# Patient Record
Sex: FEMALE | Race: OTHER | HISPANIC OR LATINO | URBAN - METROPOLITAN AREA
[De-identification: names, ages, dates, MRNs, and addresses within clinical notes are randomized per-mention and may not be internally consistent; named-entity substitution may affect disease eponyms.]

---

## 2020-10-11 ENCOUNTER — EMERGENCY (EMERGENCY)
Facility: HOSPITAL | Age: 25
LOS: 0 days | Discharge: HOME | End: 2020-10-12
Attending: EMERGENCY MEDICINE
Payer: MEDICAID

## 2020-10-11 VITALS
TEMPERATURE: 98 F | DIASTOLIC BLOOD PRESSURE: 75 MMHG | HEART RATE: 112 BPM | RESPIRATION RATE: 17 BRPM | OXYGEN SATURATION: 100 % | SYSTOLIC BLOOD PRESSURE: 130 MMHG

## 2020-10-11 VITALS — WEIGHT: 132.06 LBS | HEIGHT: 61 IN

## 2020-10-11 DIAGNOSIS — J02.9 ACUTE PHARYNGITIS, UNSPECIFIED: ICD-10-CM

## 2020-10-11 DIAGNOSIS — R07.0 PAIN IN THROAT: ICD-10-CM

## 2020-10-11 DIAGNOSIS — O99.891 OTHER SPECIFIED DISEASES AND CONDITIONS COMPLICATING PREGNANCY: ICD-10-CM

## 2020-10-11 DIAGNOSIS — O99.511 DISEASES OF THE RESPIRATORY SYSTEM COMPLICATING PREGNANCY, FIRST TRIMESTER: ICD-10-CM

## 2020-10-11 DIAGNOSIS — Z3A.13 13 WEEKS GESTATION OF PREGNANCY: ICD-10-CM

## 2020-10-11 PROCEDURE — 99283 EMERGENCY DEPT VISIT LOW MDM: CPT

## 2020-10-11 PROCEDURE — 87880 STREP A ASSAY W/OPTIC: CPT

## 2020-10-11 RX ORDER — AMOXICILLIN 250 MG/5ML
500 SUSPENSION, RECONSTITUTED, ORAL (ML) ORAL ONCE
Refills: 0 | Status: COMPLETED | OUTPATIENT
Start: 2020-10-11 | End: 2020-10-11

## 2020-10-11 RX ORDER — ACETAMINOPHEN 500 MG
650 TABLET ORAL ONCE
Refills: 0 | Status: COMPLETED | OUTPATIENT
Start: 2020-10-11 | End: 2020-10-11

## 2020-10-11 RX ORDER — AMOXICILLIN 250 MG/5ML
1 SUSPENSION, RECONSTITUTED, ORAL (ML) ORAL
Qty: 15 | Refills: 0
Start: 2020-10-11 | End: 2020-10-15

## 2020-10-11 RX ADMIN — Medication 500 MILLIGRAM(S): at 23:52

## 2020-10-11 RX ADMIN — Medication 40 MILLIGRAM(S): at 23:52

## 2020-10-11 RX ADMIN — Medication 650 MILLIGRAM(S): at 23:52

## 2020-10-11 NOTE — ED PROVIDER NOTE - ENMT, MLM
Airway patent, Nasal mucosa clear. Mouth with normal mucosa. Throat has no vesicles, no oropharyngeal exudates and uvula is midline. +erythematous posterior pharynx.

## 2020-10-11 NOTE — ED ADULT TRIAGE NOTE - CHIEF COMPLAINT QUOTE
Pt presents to ER c/o sore throat. Onset of symptoms began this morning. Pt reports it is painful to swallow. Pt reports she slept at family's house last night and slept next to the air condition and believes that may be the cause. Denies any other symptoms

## 2020-10-11 NOTE — ED PROVIDER NOTE - PATIENT PORTAL LINK FT
You can access the FollowMyHealth Patient Portal offered by [facility] by registering at the following website: [email or web address]. By joining [facility]’s FollowMyHealth portal, you will also be able to view your health information using other applications (apps) compatible with our system.

## 2020-10-11 NOTE — ED ADULT NURSE NOTE - OBJECTIVE STATEMENT
pt presents to the ED with sore throat. Pt reports it is painful to swallow. Pt reports she slept at family's house last night and slept next to the air condition and believes that may be the cause. Denies any other symptoms

## 2020-10-11 NOTE — ED ADULT NURSE NOTE - NSIMPLEMENTINTERV_GEN_ALL_ED
Implemented All Universal Safety Interventions:  [place] to call system. Call bell, personal items and telephone within reach. Instruct patient to call for assistance. Room bathroom lighting operational. Non-slip footwear when patient is off stretcher. Physically safe environment: no spills, clutter or unnecessary equipment. Stretcher in lowest position, wheels locked, appropriate side rails in place.

## 2020-10-11 NOTE — ED PROVIDER NOTE - OBJECTIVE STATEMENT
26 yo female , 13 weeks pregnant, with 1 day of sore throat, hoarse voice, diff swallowing. no fever. no cough. took tylenol at 2pm with relief. denies PMHX. Denies PSHx. NKDA. no cp, sob, abd pain. no n/v/d.

## 2020-10-11 NOTE — ED PROVIDER NOTE - CLINICAL SUMMARY MEDICAL DECISION MAKING FREE TEXT BOX
pt presents with acute pharyngitis, will send strep test, start on abx, steroids and give tylenol. f/u her pmd.

## 2020-10-12 LAB — S PYO AG SPEC QL IA: POSITIVE

## 2020-10-12 RX ORDER — AMOXICILLIN 250 MG/5ML
1 SUSPENSION, RECONSTITUTED, ORAL (ML) ORAL
Qty: 15 | Refills: 0
Start: 2020-10-12 | End: 2020-10-16

## 2021-02-22 ENCOUNTER — EMERGENCY (EMERGENCY)
Facility: HOSPITAL | Age: 26
LOS: 0 days | Discharge: HOME | End: 2021-02-22
Attending: EMERGENCY MEDICINE
Payer: MEDICAID

## 2021-02-22 VITALS
TEMPERATURE: 98 F | SYSTOLIC BLOOD PRESSURE: 113 MMHG | OXYGEN SATURATION: 96 % | RESPIRATION RATE: 17 BRPM | DIASTOLIC BLOOD PRESSURE: 70 MMHG | HEART RATE: 99 BPM

## 2021-02-22 VITALS — HEIGHT: 61 IN | WEIGHT: 154.1 LBS

## 2021-02-22 DIAGNOSIS — S60.469A: ICD-10-CM

## 2021-02-22 DIAGNOSIS — O9A.213 INJURY, POISONING AND CERTAIN OTHER CONSEQUENCES OF EXTERNAL CAUSES COMPLICATING PREGNANCY, THIRD TRIMESTER: ICD-10-CM
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
## 2021-02-22 DIAGNOSIS — S70.361A INSECT BITE (NONVENOMOUS), RIGHT THIGH, INITIAL ENCOUNTER: ICD-10-CM

## 2021-02-22 DIAGNOSIS — S60.561A INSECT BITE (NONVENOMOUS) OF RIGHT HAND, INITIAL ENCOUNTER: ICD-10-CM

## 2021-02-22 DIAGNOSIS — Z3A.28 28 WEEKS GESTATION OF PREGNANCY: ICD-10-CM
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
## 2021-02-22 PROCEDURE — 99282 EMERGENCY DEPT VISIT SF MDM: CPT

## 2021-02-22 NOTE — ED ADULT NURSE NOTE - CHIEF COMPLAINT QUOTE
pt is 7months pregnant c/o possible bed bug bites to the hand and R. upper thigh. small, itchy, red marks noted to R. thumb, 3rd finger, and top of hand, pt reports 3 more bites on the R. thigh. states, she used an old sheet after moving w/o washing it and then the bites appeared. pt brought to [facility] room.

## 2021-02-22 NOTE — ED ADULT NURSE NOTE - NSIMPLEMENTINTERV_GEN_ALL_ED
Implemented All Universal Safety Interventions:  [place] to call system. Call bell, personal items and telephone within reach. Instruct patient to call for assistance. Room bathroom lighting operational. Non-slip footwear when patient is off stretcher. Physically safe environment: no spills, clutter or unnecessary equipment. Stretcher in lowest position, wheels locked, appropriate side rails in place.

## 2021-02-22 NOTE — ED PROVIDER NOTE - PATIENT PORTAL LINK FT
You can access the FollowMyHealth Patient Portal offered by [facility] by registering at the following website: [email or web address]. By joining [employer]’s FollowMyHealth portal, you will also be able to view your health information using other applications (apps) compatible with our system.

## 2021-02-22 NOTE — ED PROVIDER NOTE - CARE PLAN
Principal Discharge DX:	Bug bite of hand, infected, right, initial encounter  Secondary Diagnosis:	Bug bite, initial encounter

## 2021-02-22 NOTE — ED PROVIDER NOTE - NSFOLLOWUPINSTRUCTIONS_ED_ALL_ED_FT
Log Out.      [employer]® CareNotes®     :  [facility]  	                       INSECT BITE OR STING - General Information           Mordida o picadura de insecto    LO QUE NECESITA SABER:    ¿Qué necesito saber acerca de la mordida o picadura de un insecto?La mayoría de las mordidas o picaduras de insecto no son peligrosas y desaparecen sin tratamiento. Algunos ejemplos comunes de insectos que muerden o pican son las abejas, garrapatas, mosquitos, arañas y hormigas. Las picaduras de insecto pueden desencadenar enfermedades devora la malaria, virus del [place], la enfermedad del Lyme o fiebre manchada de las [place].    ¿Cuáles son los signos y síntomas de herlinda reacción alérgica de herlinda mordedura o picadura de insecto?  •Los síntomas levesincluye herlinda protuberancia maureen, dolor, inflamación y comezón.      •Los síntomas de la anafilaxiaincluyen opresión en la garganta, dificultad para respirar, hormigueo, mareos y sibilancias. La anafilaxia es herlinda reacción repentina y de peligro mortal que requiere de tratamiento inmediato.      ¿Cómo se trata herlinda reacción alérgica a herlinda mordida o picadura de insecto?El tratamiento depende de la severidad de kirill síntomas y de si usted ha sufrido de anafilaxia anteriormente. Es posible que usted necesite alguno de los siguientes:  •Los antihistamínicosdisminuyen los síntomas leves devora comezón o sarpullido.      •La epinefrinaes un medicamento usado para tratar reacciones alérgicas graves devora la anafilaxia.      •Herlinda vacuna antitetánicapodrían administrarse. La inyección es herlinda vacuna que ayuda a prevenir herlinda infección bacteriana. El refuerzo de la vacuna antitetánica suele aplicarse cada 10 años.      ¿Qué pasos necesito seguir cuando hay señales o síntomas de anafilaxia?  •Inmediatamenteaplíquese 1 inyección de epinefrina justino hágalo solamente en el músculo del muslo que da hacia afuera.  Cómo aplicar herlinda inyección de epinefrina a un adulto           •Deje la inyección en el lugarsegún las indicaciones. Es probable que gonzalez médico le recomiende que se la deje puesta por hasta 10 segundos antes de quitarla. [place] ayuda a asegurarse de que toda la epinefrina sea aplicada.      •Llame al 911 y vaya al servicio de urgencias,aunque la inyección haya stepan kirill síntomas. No maneje usted mismo. Lleve con usted la inyección de epinefrina que usó.      ¿Que debería hacer si recibo herlinda mordida o picadura de insecto?  •Remueva el aguijón.Raspe el aguijón con la uña de gonzalez dedo, con la orilla de herlinda tarjeta de crédito o con un cuchillo. No apriete la herida. Lave suavemente el área con jabón y agua.      •Remueva la garrapata.Las garrapatas deben quitarse lo más pronto posible para que usted no contraiga enfermedades trasmitidas por la mordida de herlinda garrapata. Pida más información a gonzalez médico acerca de las mordidas de garrapata o cómo removerlas.      ¿Qué puedo hacer para cuidar mi herida de mordida o picadura?  •Eleve el área por encima del nivel del corazón, si es posible.Coloque el área sobre almohadas para mantenerla elevada cómodamente. Eleve el área de 10 a 20 minutos cada hora o devora se lo indique gonzalez médico.             •Aplique compresas.Remoje un paño limpio en agua fría, escúrralo y aplíquelo en la mordida o picadura. Use la compresa de 10 a 20 minutos cada hora o devora le lo indique gonzalez médico. Después de 24 a 48 horas, cambie a compresas tibias.      •Aplique pasta de bicarbonato de sodio.Agregue agua a bicarbonato de sodio para hacer herlinda pasta espesa. Aplíquela en el área por 5 minutos. Enjuague suavemente para remover la pasta.      ¿Que precauciones de seguridad necesito seguir si estoy en riesgo de presentar anafilaxia?  •Tenga a mano 2 inyecciones de epinefrina en todo momento.Puede que usted necesite herlinda segunda inyección ya que la epinefrina solamente actúa por aproximadamente 20 minutos y los síntomas podrían regresar. Gonzalez médico puede mostrarle a usted y a kirill familiares cómo aplicar la inyección. Revise la fecha de vencimiento todos los meses y reemplace el medicamento de gonzalez vencimiento.      •Elabore un plan de acción.Gonzalez médico puede ayudarle a crear un plan escrito que explique la alergia y un plan de emergencia para tratar herlinda reacción. El plan explica cuándo aplicar herlinda segunda inyección de epinefrina si los síntomas vuelven o no mejoran después de la primera inyección. Asegúrese de que kirill familiares, personal de gonzalez trabajo y escuela tengan herlinda copia del plan de acción e instrucciones de emergencia. Muéstreles cómo aplicar la inyección de epinefrina.      •Lleve herlinda identificación de alerta médica.Use accesorios o lleve herlinda tarjeta que diga que tiene alergia a los insectos. Pregúntele a gonzalez médico dónde conseguir estos artículos.  Accesorios de alerta médica           ¿Qué puedo hacer para evitar herlinda mordida o picadura de insecto?  •No vista ropa de colores brillantes o con estampados em cuando planee pasar tiempo al aire luigi. No utilice spray para el zackary, perfumes o crema para afeitar.      •No deje alimentos afuera.      •Vacíe el agua estancada. Lave los contenedores con jabón y agua cada 2 días.      •Coloque mosquiteros en todas las ventanas y blanche abiertas.      •Aplique repelente de insectos que contenga DEET sobre la piel descubierta cuando esté al aire luigi. Aplique el repelente en la parte superior de las botas, en la parte inferior del pantalón y en los puños de las camisas. Vista con manga larga, pantalones y zapatos.      •Utilice jomar de citronela al aire luigi para ayudar a mantener los mosquitos alejados. Coloque un collar contra las pulgas y garrapatas a kirill mascotas.      Llame al número local de emergencias (911 en los [place]) si tiene síntomas o signos de anafilaxia,devora dificultad para respirar, inflamación en gonzalez boca o garganta, o sibilancias. También es posible que tenga comezón, sarpullido, urticaria o sienta que se va a desmayar.    ¿Cuándo catarino buscar atención inmediata?  •Usted recibe herlinda picadura en gonzalez lengua o en gonzalez garganta.      •Se forma un área aashish alrededor de la mordida.      •Usted está sudando demasiado o tiene dolor en gonzalez cuerpo.      •Usted piensa que lo mordió o picó un insecto venenoso.      ¿Cuándo catarino llamar a mi médico?  •Tiene fiebre.      •El área se pone maureen, cálida, sensible e inflamada más allá del área de la mordida o de la picadura.      •Usted tiene preguntas o inquietudes acerca de gonzalez condición o cuidado.      ACUERDOS SOBRE GONZALEZ CUIDADO:    Usted tiene el derecho de ayudar a planear gonzalez cuidado. Aprenda todo lo que pueda sobre gonzalez condición y devora darle tratamiento. Discuta kirill opciones de tratamiento con kirill médicos para decidir el cuidado que usted desea recibir. Usted siempre tiene el derecho de rechazar el tratamiento.

## 2021-02-22 NOTE — ED PROVIDER NOTE - PROGRESS NOTE DETAILS
pt seen and evaluated and has bug bites to left lateral thigh and right web of 4 and 5 finger. pt denies taking any medication for symptoms. pt advised to clean area of insect bites and use hot water to wash everything and clean her matress and chairs and fu with her ob/gyn, she can use over the counter topical anti itch cream also, pt agrees with plan and well appearing on dc, pt was decon at the hospital when she arrived. -Mary Alice Sims PA-C pt was decon'ed in the ED. MD SKYE

## 2021-02-22 NOTE — ED PROVIDER NOTE - CONSTITUTIONAL [-], MLM
Quality 130: Documentation Of Current Medications In The Medical Record: Current Medications Documented
Detail Level: Generalized
no fever

## 2021-02-22 NOTE — ED PROVIDER NOTE - OBJECTIVE STATEMENT
69 y/o female with no pertinent PMHx presents to the ED c/o insect bites. Pt recently moved, borrowed cloth furniture from her in-laws, and the next days noticed small insect bites on her right thigh and right finger with surrounding redness, itching. Pt is 7 months pregnant. Pt has no other medical complaints at this time.

## 2021-02-22 NOTE — ED ADULT TRIAGE NOTE - CHIEF COMPLAINT QUOTE
pt is 7months pregnant c/o possible bed bug bites to the hand and R. upper thigh. small, itchy, red marks noted to R. thumb, 3rd finger, and top of hand, pt reports 3 more bites on the R. thigh. states, she used an old sheet after moving w/o washing it and then the bites appeared. pt brought to [facility] room.

## 2021-02-22 NOTE — ED PROVIDER NOTE - SKIN, MLM
Skin normal color for race, warm, dry and intact. +3 pinpoint lesions with surrounding erythema, right lateral thigh, one on right lateral 4th, 5th web of finger

## 2021-08-19 ENCOUNTER — EMERGENCY (EMERGENCY)
Facility: HOSPITAL | Age: 26
LOS: 0 days | Discharge: HOME | End: 2021-08-19
Attending: EMERGENCY MEDICINE
Payer: MEDICAID

## 2021-08-19 VITALS
HEIGHT: 61 IN | WEIGHT: 136.03 LBS | OXYGEN SATURATION: 97 % | RESPIRATION RATE: 18 BRPM | HEART RATE: 76 BPM | TEMPERATURE: 99 F | SYSTOLIC BLOOD PRESSURE: 107 MMHG | DIASTOLIC BLOOD PRESSURE: 58 MMHG

## 2021-08-19 DIAGNOSIS — R09.81 NASAL CONGESTION: ICD-10-CM

## 2021-08-19 DIAGNOSIS — U07.1 COVID-19: ICD-10-CM

## 2021-08-19 LAB — SARS-COV-2 RNA SPEC QL NAA+PROBE: DETECTED

## 2021-08-19 PROCEDURE — 99284 EMERGENCY DEPT VISIT MOD MDM: CPT

## 2021-08-19 PROCEDURE — U0003: CPT

## 2021-08-19 PROCEDURE — U0005: CPT

## 2021-08-19 PROCEDURE — 99282 EMERGENCY DEPT VISIT SF MDM: CPT

## 2021-08-19 NOTE — ED STATDOCS - NSTIMEPROVIDERCAREINITIATE_GEN_ER
Hypertension - Start taking 12.5 MG losartan-hydrochlorothiazide once daily. Followup in one week for blood pressure recheck.    [facility]                        To reach your care team during and after hours:   [phone]  To reach our pharmacy:        [phone]    Clinic Hours                        Our clinic hours are:    Monday   7:30 am to 7:00 pm                  Tuesday through Friday 7:30 am to 5:00 pm                             Saturday   8:00 am to 12:00 pm      Sunday   Closed      Pharmacy Hours                        Our pharmacy hours are:    Monday   8:30 am to 7:00 pm       Tuesday to Friday  8:30 am to 6:00 pm                       Saturday    9:00 am to 1:00 pm              Sunday    Closed              There is also information available at our web site:  [email or web address]    If your provider ordered any lab tests and you do not receive the results within 10 business days, please call the clinic.    If you need a medication refill please contact your pharmacy.  Please allow 2-3 business days for your refill to be completed.    Our clinic offers telephone visits and e visits.  Please ask one of your team members to explain more.      Use [employer] (secure email communication and access to your chart) to send your primary care provider a message or make an appointment. Ask someone on your Team how to sign up for [employer].  Immunizations                      Immunization History   Administered Date(s) Administered     Influenza (H1N1) 01/12/2010     Influenza (IIV3) 11/05/2008, 11/03/2009, 12/10/2010     TD (ADULT, 7+) 01/01/2005     TDAP Vaccine (Boostrix) 07/29/2015        Health Maintenance                         Health Maintenance Due   Topic Date Due     Microalbumin Lab - yearly  07/18/1959     Colon Cancer Screening - FIT Test - yearly  07/18/1968     Discuss Advance Directive Planning  07/18/1976     Hepatitis C Screening  07/18/1976     Cholesterol Lab - yearly   06/16/2010     Prostate Test (PSA) - yearly  04/01/2015     Basic Metabolic Lab - yearly  09/23/2016        19-Aug-2021 18:42

## 2021-08-19 NOTE — ED STATDOCS - NS ED ROS FT
Gen: No fever, normal appetite  Eyes: No eye irritation or discharge  ENT: No ear pain, sore throat, +nasal congestion  Resp: No cough or trouble breathing  Cardiovascular: No chest pain or palpitation  Gastroenteric: No nausea/vomiting, diarrhea, constipation  :  No change in urine output; no dysuria  MS: No joint or muscle pain  Skin: No rashes  Neuro: No headache; no abnormal movements  Remainder negative, except as per the HPI

## 2021-08-19 NOTE — ED STATDOCS - OBJECTIVE STATEMENT
24 y/o female with no pertinent PMHx, presents to the ED c/o nasal congestion x 1 week. Pt is not COVID vaccinated. States her  had a fever on Monday and he was tested +. Pt requesting a COVID test. Pt otherwise feels well.

## 2021-08-19 NOTE — ED STATDOCS - NSFOLLOWUPINSTRUCTIONS_ED_ALL_ED_FT
COVID-19 (CORONAVIRUS DISEASE 2019) - [facility]) Instructions(ER/ED)           COVID-19 (Coronavirus Disease 2019)    WHAT YOU NEED TO KNOW:    Coronavirus disease 2019 (COVID-19) is the disease caused by a coronavirus first discovered in 2019. Coronaviruses generally cause upper respiratory (nose, throat, and lung) infections, such as a cold. The new virus spreads quickly and easily. The virus can be spread starting 2 days before symptoms even begin. The virus has also changed into several new forms (called variants) since it was discovered. The variants may be more contagious (easily spread) than the original form. Some may also cause more severe illness than others. It is important to follow local, national, and worldwide measures to protect yourself and others from infection.    DISCHARGE INSTRUCTIONS:    If you think you or someone you know may be infected: Do the following to protect others:   •If emergency care is needed, tell the  about the possible infection, or call ahead and tell the emergency department.      •Call a healthcare provider for instructions if symptoms are mild. Anyone who may be infected should not arrive without calling first. The provider will need to protect staff members and other patients.      •The person who may be infected needs to wear a face covering while getting medical care. This will help lower the risk of infecting others. Coverings are not used for anyone who is younger than 2 years, has breathing problems, or cannot remove it. The provider can give you instructions for anyone who cannot wear a covering.      Call your local emergency number (911 in the [place]) or an emergency department if:   •You have trouble breathing or shortness of breath at rest.      •You have chest pain or pressure that lasts longer than 5 minutes.      •You become confused or hard to wake.      •Your lips or face are blue.      •You have a fever of 104°F (40°C) or higher.      Call your doctor if:   •You do not have symptoms of COVID-19 but had close physical contact within 14 days with someone who tested positive.      •You have questions or concerns about your condition or care.      Medicines: You may need any of the following for mild symptoms:   •Decongestants help reduce nasal congestion and help you breathe more easily. If you take decongestant pills, they may make you feel restless or cause problems with your sleep. Do not use decongestant sprays for more than a few days.      •Cough suppressants help reduce coughing. Ask your healthcare provider which type of cough medicine is best for you.      •Acetaminophen decreases pain and fever. It is available without a doctor's order. Ask how much to take and how often to take it. Follow directions. Read the labels of all other medicines you are using to see if they also contain acetaminophen, or ask your doctor or pharmacist. Acetaminophen can cause liver damage if not taken correctly. Do not use more than 4 grams (4,000 milligrams) total of acetaminophen in one day.       •NSAIDs, such as ibuprofen, help decrease swelling, pain, and fever. NSAIDs can cause stomach bleeding or kidney problems in certain people. If you take blood thinner medicine, always ask your healthcare provider if NSAIDs are safe for you. Always read the medicine label and follow directions.      •Take your medicine as directed. Contact your healthcare provider if you think your medicine is not helping or if you have side effects. Tell him or her if you are allergic to any medicine. Keep a list of the medicines, vitamins, and herbs you take. Include the amounts, and when and why you take them. Bring the list or the pill bottles to follow-up visits. Carry your medicine list with you in case of an emergency.      How the 2019 coronavirus spreads: The following are ways the virus is thought to spread, but more information may be coming:   •Droplets are the main way all coronaviruses spread. The virus travels in droplets that form when a person talks, coughs, or sneezes. The droplets can also float in the air for minutes or hours. Infection happens when you breathe in the droplets or get them in your eyes or nose. Close personal contact with an infected person increases your risk for infection. This means being within 6 feet (2 meters) of the person for at least 15 minutes over 24 hours.      •Person-to-person contact can spread the virus. For example, a person with the virus on his or her hands can spread it by shaking hands with someone.      •The virus can stay on objects and surfaces for a short time. You may become infected by touching the object or surface and then touching your eyes or mouth.      •An infected animal may be able to infect a person who touches it. This may happen at live markets or on a farm.      Help lower the risk for COVID-19: The best way to prevent infection is to avoid anyone who is infected, but this can be hard to do. An infected person can spread the virus before signs or symptoms begin, or even if signs or symptoms never develop. The following can help lower the risk for infection:     Prevent COVID-19 Infection     •Wash your hands often throughout the day. Use soap and water. Rub your soapy hands together, lacing your fingers, for at least 20 seconds. Rinse with warm, running water. Dry your hands with a clean towel or paper towel. Use hand  that contains alcohol if soap and water are not available. Teach children how to wash their hands and use hand .  Handwashing           •Cover sneezes and coughs. Turn your face away and cover your mouth and nose with a tissue. Throw the tissue away. Use the bend of your arm if a tissue is not available. Then wash your hands well with soap and water or use hand . Teach children how to cover a cough or sneeze.      •Wear a face covering (mask) around anyone who does not live in your home. Use a cloth covering with at least 2 layers. You can also create layers by putting a cloth covering over a disposable non-medical mask. Cover your mouth and your nose. The covering should fit snugly against the bridge of your nose. Securely fasten it under your chin and on the sides of your face. Do not wear a plastic face shield instead of a covering. Continue social distancing and washing your hands often. A face covering is not a substitute for social distancing safety measures.  How to Wear a Face Covering (Mask)           •Follow worldwide, national, and local social distancing guidelines. Keep at least 6 feet (2 meters) between you and others. Also keep this distance from anyone who comes to your home, such as someone making a delivery. Wear a face covering while you are around others. You will need to wear a covering in restaurants, stores, and other public buildings. You will also need a covering on mass transit, such as a bus, subway, or airplane. Remember to use a covering made from thick material or wear 2 coverings together.      •Make a habit of not touching your face. If you get the virus on your hands, you can transfer it to your eyes, nose, or mouth and become infected. You can also transfer it to objects, surfaces, or people. Do not touch your eyes, nose, or mouth without washing your hands first.      •Clean and disinfect high-touch surfaces and objects often. Use disinfecting wipes, or make a solution of 4 teaspoons of bleach in 1 quart (4 cups) of water. Clean and disinfect even if you think no one living in or coming to your home is infected with the virus.      •Ask about vaccines you may need. Get a COVID-19 vaccine when it is available to you. The current vaccines are given as a shot in 1 or 2 doses. Get the influenza (flu) vaccine as soon as recommended each year, usually starting in September or October. Get the pneumonia vaccine if recommended.      Follow social distancing guidelines: National and local social distancing rules vary. Rules may change over time as restrictions are lifted. Restrictions may return if an outbreak happens where you live. It is important to know and follow all current social distancing rules in your area. The following are general guidelines:  •Stay home if you are sick or think you may have COVID-19. It is important to stay home if you are waiting for a testing appointment or for test results. Even if you do not have symptoms, you can pass the virus to others.      •Limit trips out of your home. Have food, medicines, and other supplies delivered and left at your door or other area, if possible. Plan trips out of your home so you make the fewest stops possible to limit close personal contact. Keep track of places you go. This will help contact tracers notify others if you become infected.      •Avoid close physical contact with anyone who does not live in your home. Do not shake hands with, hug, or kiss a person as a greeting. If you must use public transportation (such as a bus or subway), try to sit or stand away from others. Only allow necessary people into your home. Wear your face covering, and remind others to wear a face covering. Remind them to wash their hands when they arrive and before they leave. Do not let someone into your home or go to someone's home just to visit. Even if you both do not feel sick, the virus can pass from one of you to the other.      •Avoid in-person gatherings and crowds. Gatherings or crowds of 10 or more individuals can cause the virus to spread. Avoid places such as franks, beaches, sporting events, and tourist attractions. For events such as parties, holiday meals, [employer] services, and conferences, attend virtually (on a computer), if possible.      •Ask your healthcare provider for other ways to have appointments. Some providers offer phone, video, or other types of appointments. You may also be able to get prescriptions for a few months of your medicines at a time.      •Stay safe if you must go out to work. Keep physical distance between you and other workers as much as possible. Follow your employer's rules so everyone stays safe.      If you have COVID-19 and are recovering at home, healthcare providers will give you specific instructions to follow. The following are general guidelines to remind you how to keep others safe until you are well:   •Wash your hands often. Use soap and water as much as possible. Use hand  that contains alcohol if soap and water are not available. Dry your hands with a clean towel or paper towel. Do not share towels with anyone. If you use paper towels, throw them away in a lined trash can kept in your room or area. Use a covered trash can, if possible.      •Do not go out of your home unless it is necessary. Ask someone who is not infected to go out for groceries or supplies, or have them delivered. Do not go to your healthcare provider's office without an appointment.      •Only have close physical contact with a person giving direct care, or a baby or child you must care for. Family members and friends should not visit you. If possible, stay in a separate area or room of your home if you live with others. No one should go into the area or room except to give you care. You can visit with others by phone, video chat, e-mail, or similar systems.      •Wear a face covering while others are near you. This can help prevent droplets from spreading the virus when you talk, sneeze, or cough. Put the covering on before anyone comes into your room or area. Remind the person to cover his or her nose and mouth before coming in to provide care for you.      •Do not share items. Do not share dishes, towels, or other items with anyone. Items need to be washed after you use them.      •Protect your baby. Some newborns have tested positive for the virus. It is not known if they became infected before or after birth. The highest risk is when a  has close contact with an infected person. If you are pregnant or breastfeeding, talk to your healthcare provider or obstetrician about any concerns you have. He or she will tell you when to bring your baby in for check-ups and vaccines. He or she will also tell you what to do if you think your baby was infected with the coronavirus. Wash your hands and put on a clean face covering before you breastfeed or care for your baby.      •Do not handle live animals unless it is necessary. Some animals, including pets, have been infected with the new coronavirus. Ask someone who is not infected to take care of your pet until you are well. If you must care for a pet, wear a face covering. Wash your hands before and after you give care. Talk to your healthcare provider about how to keep a service animal safe, if needed.      •Follow directions from your healthcare provider for being around others after you recover. It is not known if or for how long a recovered person can pass the virus to others. Your provider may give you instructions, such as continuing social distancing and wearing a face covering. He or she will tell you when it is okay to be around others again. This may be 10 to 20 days after symptoms started or you had a positive test. Most symptoms will also need to be gone. Your provider will give you more information.      Follow up with your doctor as directed: Write down your questions so you remember to ask them during your visits.    For more information:   •[employer] [employer] Disease Control and Prevention  [address] [place] [address]  Phone: 1-237.449.4842  Web Address: http://www.cdc.gov           © Copyright [employer]            back to top                          © Copyright [employer]

## 2021-08-19 NOTE — ED ADULT TRIAGE NOTE - CHIEF COMPLAINT QUOTE
pt presents to ED requesting COVID-19 test, states + cough and loss of sense of smell x 4 days.  is + for COVID

## 2021-08-19 NOTE — ED STATDOCS - PATIENT PORTAL LINK FT
You can access the FollowMyHealth Patient Portal offered by [facility] by registering at the following website: [email or web address] By joining [facility]’s FollowMyHealth portal, you will also be able to view your health information using other applications (apps) compatible with our system.

## 2021-08-20 PROBLEM — Z78.9 OTHER SPECIFIED HEALTH STATUS: Chronic | Status: ACTIVE | Noted: 2021-02-22

## 2022-09-26 ENCOUNTER — EMERGENCY (EMERGENCY)
Facility: HOSPITAL | Age: 27
LOS: 0 days | Discharge: HOME | End: 2022-09-27
Attending: EMERGENCY MEDICINE
Payer: MEDICAID

## 2022-09-26 VITALS — HEIGHT: 61 IN | WEIGHT: 106.04 LBS

## 2022-09-26 DIAGNOSIS — R30.0 DYSURIA: ICD-10-CM

## 2022-09-26 DIAGNOSIS — N39.0 URINARY TRACT INFECTION, SITE NOT SPECIFIED: ICD-10-CM

## 2022-09-26 DIAGNOSIS — R39.15 URGENCY OF URINATION: ICD-10-CM

## 2022-09-26 DIAGNOSIS — R35.0 FREQUENCY OF MICTURITION: ICD-10-CM

## 2022-09-26 DIAGNOSIS — Z97.5 PRESENCE OF (INTRAUTERINE) CONTRACEPTIVE DEVICE: ICD-10-CM

## 2022-09-26 PROCEDURE — 87086 URINE CULTURE/COLONY COUNT: CPT

## 2022-09-26 PROCEDURE — 99283 EMERGENCY DEPT VISIT LOW MDM: CPT

## 2022-09-26 PROCEDURE — 99284 EMERGENCY DEPT VISIT MOD MDM: CPT

## 2022-09-26 PROCEDURE — 81001 URINALYSIS AUTO W/SCOPE: CPT

## 2022-09-26 RX ORDER — PHENAZOPYRIDINE HCL 100 MG
100 TABLET ORAL ONCE
Refills: 0 | Status: COMPLETED | OUTPATIENT
Start: 2022-09-26 | End: 2022-09-26

## 2022-09-26 RX ADMIN — Medication 100 MILLIGRAM(S): at 23:25

## 2022-09-26 NOTE — ED PROVIDER NOTE - NSFOLLOWUPINSTRUCTIONS_ED_ALL_ED_FT
Urinary Tract Infection, Adult  ImageA urinary tract infection (UTI) is an infection of any part of the urinary tract, which includes the kidneys, ureters, bladder, and urethra. These organs make, store, and get rid of urine in the body. UTI can be a bladder infection (cystitis) or kidney infection (pyelonephritis).    What are the causes?  This infection may be caused by fungi, viruses, or bacteria. Bacteria are the most common cause of UTIs. This condition can also be caused by repeated incomplete emptying of the bladder during urination.    What increases the risk?  This condition is more likely to develop if:    You ignore your need to urinate or hold urine for long periods of time.  You do not empty your bladder completely during urination.  You wipe back to front after urinating or having a bowel movement, if you are female.  You are uncircumcised, if you are male.  You are constipated.  You have a urinary catheter that stays in place (indwelling).  You have a weak defense (immune) system.  You have a medical condition that affects your bowels, kidneys, or bladder.  You have diabetes.  You take antibiotic medicines frequently or for long periods of time, and the antibiotics no longer work well against certain types of infections (antibiotic resistance).  You take medicines that irritate your urinary tract.  You are exposed to chemicals that irritate your urinary tract.  You are female.    What are the signs or symptoms?  Symptoms of this condition include:    Fever.  Frequent urination or passing small amounts of urine frequently.  Needing to urinate urgently.  Pain or burning with urination.  Urine that smells bad or unusual.  Cloudy urine.  Pain in the lower abdomen or back.  Trouble urinating.  Blood in the urine.  Vomiting or being less hungry than normal.  Diarrhea or abdominal pain.  Vaginal discharge, if you are female.    How is this diagnosed?  This condition is diagnosed with a medical history and physical exam. You will also need to provide a urine sample to test your urine. Other tests may be done, including:    Blood tests.  Sexually transmitted disease (STD) testing.    If you have had more than one UTI, a cystoscopy or imaging studies may be done to determine the cause of the infections.    How is this treated?  Treatment for this condition often includes a combination of two or more of the following:    Antibiotic medicine.  Other medicines to treat less common causes of UTI.  Over-the-counter medicines to treat pain.  Drinking enough water to stay hydrated.    Follow these instructions at home:  Take over-the-counter and prescription medicines only as told by your health care provider.  If you were prescribed an antibiotic, take it as told by your health care provider. Do not stop taking the antibiotic even if you start to feel better.  Avoid alcohol, caffeine, tea, and carbonated beverages. They can irritate your bladder.  Drink enough fluid to keep your urine clear or pale yellow.  Keep all follow-up visits as told by your health care provider. This is important.  ImageMake sure to:    Empty your bladder often and completely. Do not hold urine for long periods of time.  Empty your bladder before and after sex.  Wipe from front to back after a bowel movement if you are female. Use each tissue one time when you wipe.    Contact a health care provider if:  You have back pain.  You have a fever.  You feel nauseous or vomit.  Your symptoms do not get better after 3 days.  Your symptoms go away and then return.  Get help right away if:  You have severe back pain or lower abdominal pain.  You are vomiting and cannot keep down any medicines or water.  This information is not intended to replace advice given to you by your health care provider. Make sure you discuss any questions you have with your health care provider. See your doctor within 1 week.    Urinary Tract Infection, Adult    A urinary tract infection (UTI) is an infection of any part of the urinary tract, which includes the kidneys, ureters, bladder, and urethra. These organs make, store, and get rid of urine in the body. UTI can be a bladder infection (cystitis) or kidney infection (pyelonephritis).    What are the causes?  This infection may be caused by fungi, viruses, or bacteria. Bacteria are the most common cause of UTIs. This condition can also be caused by repeated incomplete emptying of the bladder during urination.    What increases the risk?  This condition is more likely to develop if:    You ignore your need to urinate or hold urine for long periods of time.  You do not empty your bladder completely during urination.  You wipe back to front after urinating or having a bowel movement, if you are female.  You are uncircumcised, if you are male.  You are constipated.  You have a urinary catheter that stays in place (indwelling).  You have a weak defense (immune) system.  You have a medical condition that affects your bowels, kidneys, or bladder.  You have diabetes.  You take antibiotic medicines frequently or for long periods of time, and the antibiotics no longer work well against certain types of infections (antibiotic resistance).  You take medicines that irritate your urinary tract.  You are exposed to chemicals that irritate your urinary tract.  You are female.    What are the signs or symptoms?  Symptoms of this condition include:    Fever.  Frequent urination or passing small amounts of urine frequently.  Needing to urinate urgently.  Pain or burning with urination.  Urine that smells bad or unusual.  Cloudy urine.  Pain in the lower abdomen or back.  Trouble urinating.  Blood in the urine.  Vomiting or being less hungry than normal.  Diarrhea or abdominal pain.  Vaginal discharge, if you are female.    How is this diagnosed?  This condition is diagnosed with a medical history and physical exam. You will also need to provide a urine sample to test your urine. Other tests may be done, including:    Blood tests.  Sexually transmitted disease (STD) testing.    If you have had more than one UTI, a cystoscopy or imaging studies may be done to determine the cause of the infections.    How is this treated?  Treatment for this condition often includes a combination of two or more of the following:    Antibiotic medicine.  Other medicines to treat less common causes of UTI.  Over-the-counter medicines to treat pain.  Drinking enough water to stay hydrated.    Follow these instructions at home:  Take over-the-counter and prescription medicines only as told by your health care provider.  If you were prescribed an antibiotic, take it as told by your health care provider. Do not stop taking the antibiotic even if you start to feel better.  Avoid alcohol, caffeine, tea, and carbonated beverages. They can irritate your bladder.  Drink enough fluid to keep your urine clear or pale yellow.  Keep all follow-up visits as told by your health care provider. This is important.  ImageMake sure to:    Empty your bladder often and completely. Do not hold urine for long periods of time.  Empty your bladder before and after sex.  Wipe from front to back after a bowel movement if you are female. Use each tissue one time when you wipe.    Contact a health care provider if:  You have back pain.  You have a fever.  You feel nauseous or vomit.  Your symptoms do not get better after 3 days.  Your symptoms go away and then return.  Get help right away if:  You have severe back pain or lower abdominal pain.  You are vomiting and cannot keep down any medicines or water.  This information is not intended to replace advice given to you by your health care provider. Make sure you discuss any questions you have with your health care provider.

## 2022-09-26 NOTE — ED ADULT TRIAGE NOTE - IDEAL BODY WEIGHT(KG)
Panel Management Review      Patient has the following on her problem list:     Depression / Dysthymia review  PHQ-9 SCORE 1/11/2016 6/14/2016 10/11/2016   Total Score - - -   Total Score - - -   Total Score 9 9 5   Total Score - - -      Patient is due for:  PHQ9    Asthma review     ACT Total Scores 5/17/2017   ACT TOTAL SCORE -   ASTHMA ER VISITS -   ASTHMA HOSPITALIZATIONS -   ACT TOTAL SCORE (Goal Greater than or Equal to 20) 19   In the past 12 months, how many times did you visit the emergency room for your asthma without being admitted to the hospital? 0   In the past 12 months, how many times were you hospitalized overnight because of your asthma? 0      1. Is Asthma diagnosis on the Problem List? Yes    2. Is Asthma listed on Health Maintenance? Yes    3. Patient is due for:  ACT and AAP    Diabetes    ASA: Passed    Last A1C  Lab Results   Component Value Date    A1C 6.6 05/17/2017    A1C 8.1 03/02/2017    A1C 6.0 09/23/2015    A1C 5.4 07/27/2013     A1C tested: Passed    Last LDL:    Lab Results   Component Value Date    CHOL 253 06/14/2016     Lab Results   Component Value Date    HDL 35 06/14/2016     Lab Results   Component Value Date    LDL  06/14/2016     Cannot estimate LDL when triglyceride exceeds 400 mg/dL   Above desirable:  100-129 mg/dl   Borderline High:  130-159 mg/dL   High:             160-189 mg/dL   Very high:       >189 mg/dl       Lab Results   Component Value Date    TRIG 545 06/14/2016     Lab Results   Component Value Date    CHOLHDLRATIO 4.7 04/04/2014     Lab Results   Component Value Date    NHDL 218 06/14/2016       Is the patient on a Statin? NO             Is the patient on Aspirin? NO        Last three blood pressure readings:  BP Readings from Last 3 Encounters:   05/17/17 120/70   03/28/17 120/70   03/21/17 130/80       Date of last diabetes office visit: 5/17/17     Tobacco History:     History   Smoking Status     Current Some Day Smoker     Packs/day: 0.25     Years:  17.00     Types: Cigarettes   Smokeless Tobacco     Never Used                 Composite cancer screening  Chart review shows that this patient is due/due soon for the following Pap Smear  Summary:    Patient is due/failing the following:   ACT, PAP, PHQ9 and PHYSICAL    Action needed:   Patient needs office visit for physical and pap.    Type of outreach:    Sent letter.    Questions for provider review:    None                                                                                                                                    Genesis Stone CMA              48

## 2022-09-26 NOTE — ED PROVIDER NOTE - CLINICAL SUMMARY MEDICAL DECISION MAKING FREE TEXT BOX
Dysuria X 1 day, low back pain X 2 days.  Will check UCG, UA and treat for uncomplicated UTI if UA positive.

## 2022-09-26 NOTE — ED PROVIDER NOTE - PATIENT PORTAL LINK FT
You can access the FollowMyHealth Patient Portal offered by [facility] by registering at the following website: [email or web address]. By joining [employer]’s FollowMyHealth portal, you will also be able to view your health information using other applications (apps) compatible with our system.

## 2022-09-27 VITALS
TEMPERATURE: 98 F | SYSTOLIC BLOOD PRESSURE: 110 MMHG | OXYGEN SATURATION: 100 % | DIASTOLIC BLOOD PRESSURE: 80 MMHG | HEART RATE: 82 BPM | RESPIRATION RATE: 16 BRPM

## 2022-09-27 LAB
APPEARANCE UR: CLEAR — SIGNIFICANT CHANGE UP
BILIRUB UR-MCNC: NEGATIVE — SIGNIFICANT CHANGE UP
COLOR SPEC: YELLOW — SIGNIFICANT CHANGE UP
DIFF PNL FLD: ABNORMAL
GLUCOSE UR QL: NEGATIVE — SIGNIFICANT CHANGE UP
KETONES UR-MCNC: NEGATIVE — SIGNIFICANT CHANGE UP
LEUKOCYTE ESTERASE UR-ACNC: ABNORMAL
NITRITE UR-MCNC: NEGATIVE — SIGNIFICANT CHANGE UP
PH UR: 7 — SIGNIFICANT CHANGE UP (ref 5–8)
PROT UR-MCNC: NEGATIVE — SIGNIFICANT CHANGE UP
SP GR SPEC: 1 — LOW (ref 1.01–1.02)
UROBILINOGEN FLD QL: 1

## 2022-09-27 RX ORDER — PHENAZOPYRIDINE HCL 100 MG
1 TABLET ORAL
Qty: 6 | Refills: 0
Start: 2022-09-27 | End: 2022-09-28

## 2022-09-27 RX ORDER — CEPHALEXIN 500 MG
500 CAPSULE ORAL ONCE
Refills: 0 | Status: COMPLETED | OUTPATIENT
Start: 2022-09-27 | End: 2022-09-27

## 2022-09-27 RX ORDER — CEPHALEXIN 500 MG
1 CAPSULE ORAL
Qty: 21 | Refills: 0
Start: 2022-09-27 | End: 2022-10-03

## 2022-09-27 RX ADMIN — Medication 500 MILLIGRAM(S): at 01:45

## 2022-09-28 LAB
CULTURE RESULTS: SIGNIFICANT CHANGE UP
SPECIMEN SOURCE: SIGNIFICANT CHANGE UP

## 2023-05-04 ENCOUNTER — EMERGENCY (EMERGENCY)
Facility: HOSPITAL | Age: 28
LOS: 0 days | Discharge: HOME | End: 2023-05-04
Attending: EMERGENCY MEDICINE
Payer: COMMERCIAL

## 2023-05-04 VITALS
DIASTOLIC BLOOD PRESSURE: 66 MMHG | RESPIRATION RATE: 18 BRPM | OXYGEN SATURATION: 96 % | SYSTOLIC BLOOD PRESSURE: 108 MMHG | TEMPERATURE: 99 F | HEART RATE: 87 BPM

## 2023-05-04 VITALS — HEIGHT: 61 IN | WEIGHT: 110.01 LBS
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
## 2023-05-04 DIAGNOSIS — M54.50 LOW BACK PAIN, UNSPECIFIED: ICD-10-CM
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
## 2023-05-04 PROCEDURE — 99283 EMERGENCY DEPT VISIT LOW MDM: CPT

## 2023-05-04 RX ORDER — CYCLOBENZAPRINE HYDROCHLORIDE 10 MG/1
1 TABLET, FILM COATED ORAL
Qty: 21 | Refills: 0
Start: 2023-05-04 | End: 2023-05-10

## 2023-05-04 NOTE — ED STATDOCS - CLINICAL SUMMARY MEDICAL DECISION MAKING FREE TEXT BOX
Patient appears well.  Likely muscular pain.  D/c home with supportive care, muscle relaxers.  F/u with PCP.  Return precautions given.
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
## 2023-05-04 NOTE — ED ADULT NURSE NOTE - CAS DISCH CONDITION
Pt arrived to pre op holding. Armband verified with OR RN. Surgical consent signed by wife and Dr. Rizzo personally had spoken with wife, Gudelia, about the surgery and she agrees with both surgery and anesthesia. This RN is unable to get a hold of wife to obtain verbal consent for anesthesia at this time even with multiple attempts to call both of her cellphone and home phone. Pt taken back to OR quickly with OR staff. Full pre-op assessment unable to be completed.    Stable

## 2023-05-04 NOTE — ED ADULT NURSE NOTE - NSIMPLEMENTINTERV_GEN_ALL_ED
Implemented All Universal Safety Interventions:  [place] to call system. Call bell, personal items and telephone within reach. Instruct patient to call for assistance. Room bathroom lighting operational. Non-slip footwear when patient is off stretcher. Physically safe environment: no spills, clutter or unnecessary equipment. Stretcher in lowest position, wheels locked, appropriate side rails in place.
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
## 2023-05-04 NOTE — ED STATDOCS - PATIENT PORTAL LINK FT
You can access the FollowMyHealth Patient Portal offered by [facility] by registering at the following website: [email or web address]. By joining [employer]’s FollowMyHealth portal, you will also be able to view your health information using other applications (apps) compatible with our system.

## 2023-08-03 ENCOUNTER — EMERGENCY (EMERGENCY)
Facility: HOSPITAL | Age: 28
LOS: 0 days | Discharge: HOME | End: 2023-08-03
Attending: EMERGENCY MEDICINE
Payer: MEDICAID

## 2023-08-03 VITALS
RESPIRATION RATE: 18 BRPM | HEART RATE: 107 BPM | TEMPERATURE: 99 F | OXYGEN SATURATION: 100 % | SYSTOLIC BLOOD PRESSURE: 94 MMHG | DIASTOLIC BLOOD PRESSURE: 54 MMHG

## 2023-08-03 VITALS — WEIGHT: 110.01 LBS | HEIGHT: 64 IN

## 2023-08-03 DIAGNOSIS — J02.9 ACUTE PHARYNGITIS, UNSPECIFIED: ICD-10-CM

## 2023-08-03 DIAGNOSIS — R39.15 URGENCY OF URINATION: ICD-10-CM

## 2023-08-03 DIAGNOSIS — R05.9 COUGH, UNSPECIFIED: ICD-10-CM

## 2023-08-03 DIAGNOSIS — Z20.822 CONTACT WITH AND (SUSPECTED) EXPOSURE TO COVID-19: ICD-10-CM

## 2023-08-03 DIAGNOSIS — N39.0 URINARY TRACT INFECTION, SITE NOT SPECIFIED: ICD-10-CM

## 2023-08-03 LAB
APPEARANCE UR: ABNORMAL
BACTERIA # UR AUTO: ABNORMAL
BILIRUB UR-MCNC: NEGATIVE — SIGNIFICANT CHANGE UP
COLOR SPEC: YELLOW — SIGNIFICANT CHANGE UP
DIFF PNL FLD: ABNORMAL
EPI CELLS # UR: SIGNIFICANT CHANGE UP
FLUAV AG NPH QL: SIGNIFICANT CHANGE UP
FLUBV AG NPH QL: SIGNIFICANT CHANGE UP
GLUCOSE UR QL: NEGATIVE — SIGNIFICANT CHANGE UP
KETONES UR-MCNC: NEGATIVE — SIGNIFICANT CHANGE UP
LEUKOCYTE ESTERASE UR-ACNC: ABNORMAL
NITRITE UR-MCNC: NEGATIVE — SIGNIFICANT CHANGE UP
PH UR: 8 — SIGNIFICANT CHANGE UP (ref 5–8)
PROT UR-MCNC: 100
RBC CASTS # UR COMP ASSIST: ABNORMAL /HPF (ref 0–4)
RSV RNA NPH QL NAA+NON-PROBE: SIGNIFICANT CHANGE UP
SARS-COV-2 RNA SPEC QL NAA+PROBE: SIGNIFICANT CHANGE UP
SP GR SPEC: 1.01 — SIGNIFICANT CHANGE UP (ref 1.01–1.02)
UROBILINOGEN FLD QL: NEGATIVE — SIGNIFICANT CHANGE UP
WBC UR QL: ABNORMAL /HPF (ref 0–5)

## 2023-08-03 PROCEDURE — 87086 URINE CULTURE/COLONY COUNT: CPT

## 2023-08-03 PROCEDURE — 99284 EMERGENCY DEPT VISIT MOD MDM: CPT

## 2023-08-03 PROCEDURE — 99283 EMERGENCY DEPT VISIT LOW MDM: CPT

## 2023-08-03 PROCEDURE — 87077 CULTURE AEROBIC IDENTIFY: CPT

## 2023-08-03 PROCEDURE — 81001 URINALYSIS AUTO W/SCOPE: CPT

## 2023-08-03 PROCEDURE — 0241U: CPT

## 2023-08-03 RX ORDER — CEPHALEXIN 500 MG
500 CAPSULE ORAL ONCE
Refills: 0 | Status: COMPLETED | OUTPATIENT
Start: 2023-08-03 | End: 2023-08-03

## 2023-08-03 RX ORDER — CEPHALEXIN 500 MG
1 CAPSULE ORAL
Qty: 20 | Refills: 0
Start: 2023-08-03 | End: 2023-08-07

## 2023-08-03 RX ADMIN — Medication 500 MILLIGRAM(S): at 21:12

## 2023-08-03 NOTE — ED STATDOCS - OBJECTIVE STATEMENT
27-year-old female without significant past medical history presents with urinary symptoms.  Patient notes she has been having urgency and burning with urination.  Patient also think she had some chills earlier today.  Urinary symptoms for past few days.  Patient also notes today she woke up with a sore throat and a cough.  Her 2-year-old daughter is sick at home with similar.

## 2023-08-03 NOTE — ED STATDOCS - CLINICAL SUMMARY MEDICAL DECISION MAKING FREE TEXT BOX
Patient with urinary symptoms we will check UA.  Patient also requesting COVID swab for URI symptoms.

## 2023-08-03 NOTE — ED STATDOCS - PHYSICAL EXAMINATION
GEN - NAD; well appearing; A+O x3  HEAD - NC/AT    EYES - EOMI, no conjunctival pallor, no scleral icterus  ENT -   mucous membranes  moist , no discharge  NECK - Neck supple  PULM - CTA b/l,  symmetric breath sounds  COR -  RRR, S1 S2, no murmurs  ABD - ND, NT, soft, no guarding, no rebound, no masses    BACK - no CVA tenderness, nontender spine     EXTREMS -no edema, no deformity, warm and well perfused   SKIN - no rash or bruising      NEUROLOGIC - alert, sensation nl, motor 5/5 RUE/LUE/RLE/LLE

## 2023-08-03 NOTE — ED STATDOCS - PROGRESS NOTE DETAILS
pt aware of all lab results will treat for uti, pt aware to fu with culture results. pt aware of her COVID swab results. pt will fu with pmd and knows to return to ed for any worsening of symptoms. -Mary Alice Sims PA-C

## 2023-08-03 NOTE — ED STATDOCS - CARE PROVIDER_API CALL
Ambrosio Chery  Urology  [address], [address]  [place] [address]  Phone: [phone]  Fax: [phone]  Follow Up Time: Urgent

## 2023-08-03 NOTE — ED STATDOCS - PATIENT PORTAL LINK FT
You can access the FollowMyHealth Patient Portal offered by [facility] by registering at the following website: [email or web address]. By joining [employer]’s FollowMyHealth portal, you will also be able to view your health information using other applications (apps) compatible with our system.

## 2023-08-06 LAB
CULTURE RESULTS: SIGNIFICANT CHANGE UP
SPECIMEN SOURCE: SIGNIFICANT CHANGE UP

## 2025-01-27 NOTE — ED ADULT TRIAGE NOTE - INTERNATIONAL TRAVEL
Medicare Annual Wellness Visit    Roopa Couch is here for Medicare AWV and Hypertension    Assessment & Plan   Medicare annual wellness visit, subsequent  See below  Greater than 3 labs reviewed  White coat syndrome with diagnosis of hypertension  Whitecoat plus hypertension at home, lightheadedness with lisinopril, leg swelling with amlodipine, will try valsartan, low-dose, can increase to twice a day if needed  Obstructive sleep apnea syndrome  Continue  Primary hypertension  Chronic, not well-controlled, will start new medication per below  -     valsartan (DIOVAN) 40 MG tablet; Take 1 tablet by mouth daily, Disp-30 tablet, R-1Normal  Hx of colonic polyps  Did have updated colonoscopy, recheck in 5 years  ACP (advance care planning)  She does see stickiness, recommend she bring them in so we can scan them  Bilateral hip pain  She does have chronic burning pain, bilateral lateral hips, hip versus back, is following with pain management and plans for injection, also with chiropractor, recommended she try to increase her activity, will get hip x-ray, continue with current healthy lifestyle, anti-inflammatory nutrition  -     XR HIP BILATERAL W AP PELVIS (2 VIEWS); Future  Sacroiliitis (HCC)  Following with chiro and pain management  Lumbosacral radiculopathy  See above, unclear if this is causing the hip pain or if it is related to the hip  Abnormal magnetic resonance imaging of lumbar spine  See above    She also has osteoporosis managed by gynecology, did provide her with information on lifestyle changes and after visit summary, vitamin D and calcium both look good on labs today       Return in 6 months (on 7/27/2025) for Medicare Annual Wellness Visit in 1 year, Follow up chronic disease 6 m .     Subjective   The following acute and/or chronic problems were also addressed today:  See above and separate note    HPI:  Here for medical annual well visit, has labs to review labs     She has had injections in  No

## 2025-02-25 NOTE — ED ADULT NURSE NOTE - ED COMFORT CARE
[FreeTextEntry1] : 48-year-old with above medical history active medical problems as noted below 1.  Symptomatic frequent PVC.  Normal intervals.  No recent labs.  No syncopal event.  Recommendations Repeat labs including potassium magnesium TSH Lyme titers Echocardiogram for LV RV function evaluation considering frequent PVCs it is important to rule out any structural heart disease Event monitor to evaluate for PVC burden. Exercise treadmill stress test to assess evaluate and rule out exercise-induced arrhythmia as well as any underlying ischemic heart disease. Metoprolol succinate 25 mg half a tablet a day was added.  Risk benefits alternatives side effects are understood and reviewed.  As needed this can be further maximized Continue lower caffeine and alcohol intake. Continue regular activity and exercise unless symptoms associated with exercise then to wait for exercise treadmill stress test before starting any aggressive exercises. Stress and anxiety management. 2.  Cardiac restratification.  Fasting lipid panel ordered.  Based on about testing we will discuss further any indication for further cardiovascular testing.  Counseling regarding low saturated fat,salt and carbohydrate intake was reviewed. Active lifestyle and regular exercise  along with weight management is advised. I have reviewed above at length. I answered all the questions. Patient verbalized understandings. Thank you very much for allowing me to participate in your patient's care. Please feel free to call me for any questions.  Sincerely,  La Arriaga MD, FACC, JENAE [EKG obtained to assist in diagnosis and management of assessed problem(s)] : EKG obtained to assist in diagnosis and management of assessed problem(s) Warm blanket

## 2025-03-11 NOTE — ED ADULT TRIAGE NOTE - MEANS OF ARRIVAL
"[facility]  Diagnostic Assessment  A part of the [facility] First Episode of Psychosis Treatment Programs    Aysha Mckeon MRN# [id number]   Age: 16 year old YOB: 2008     In Person visit details:  Date of Evaluation: 3/11/25  Location of Evaluation: [facility]  People present:  Writer, Individual, Others: Mom, Sharmaine and, Aftab, psychometrist for measures and research  Start Time: 9:00am; End Time: 10:20am     Contributors to the Assessment   Chart Reviewed.   Interview completed with Aysha.  Releases of information signed by Aysha for none.  Consent to communicate signed/verbally approved for her mom.  Collateral information obtained from mom.    Diagnostic assessment today was completed by JONATHAN Rodríguez    Chief Complaint   \"I started hearing a voice telling me to hurt myself and other people.\"    History of Present Illness    Aysha Mckeon is a 16 year old patient who prefers the name Aysha and uses pronouns they, them. Aysha presents for evaluation at [facility] for services to treat first episode psychosis.  Discussed limits of confidentiality today and status as a mandated .     Referred by:   Mom's therapist recommended this program     Patient attended the session with their mom, Sharmaine , who they agreed to have interview with, patient provided assessment details, they were limited in their history. For example, many responses to questions were \"I don't know\" - regarding timeline, what symptoms were like. However, Aysha is open to suggestions for treatment and has responded well to therapy in the past. Says psychosis is new and becoming more problematic.   Aysha was carrying a fantasy book they hadn't started yet, was wearing noise canceling headphones for portions of the interview as well as was wearing a cap.      Per patient's report:  Aysha reports she started hearing a deep voice about  years ago who has been telling her to hurt themself and others. Has history of ADHD, ASD, OCD and hair " "pulling  Aysha attributes symptoms to \"I don't know, you are the expert\"    Per Collateral report:   Mom confirmed information provided by Aysha. Has a psych eval, mom agreed to send to writer.      Per medical records:     ED visit 1/21/25 TOBY Arita   Pt reports that she has had voices in her head telling her to kill herself and others for about a week now. She does share that the voices go away when she is distracted and gives her full attention to doing something else. Pt says she has not wanted to hurt herself, does not think that she would do anything and at the same time she does not know what she would or would not do. Pt denies having HI, just the voices telling her to kill other people. Occasionally, the voices would say something more specific, such as \"use that cord to strangle that person,\" but they are just a deep demonic voice telling her to hurt someone else without being too specific.     Pt does share that her sleep has been poor in the last two weeks. She has told her therapist and psychiatrist about her constant nightmares that makes it hard for her to go back to sleep, but they did not think of it as an issue. Pt denies change in other areas of functioning, and school has been going well.   Vulnerability factors: command AH, lack of sleep       ED visit 3/6/24 Brian Bernardo MD  Pt presents to room with parents. Parents report pt developed a higher pitched voice with language usage of a younger child this afternoon. Mother reports pt is speaking of a home they previously lived in as their current home. Pt continues to perseverate with statements of her mother breaking into a house today. Pt is redirectable and will answer direct questions in the room. Pt states she feels she was left behind at school today by her parents. When asked why she did not get on to her bus today, pt states that she followed some other children at school that were walking in the [place] and she missed the bus. " "         Social History:    Living situation: Aysha currently lives in [place] with parents and twin brother. Aysha reports home life is \"going good\".  Housing Type: House  Pets at home? Yes - 2 cats and her brother has a turtle   Guns, weapons, or other means to harm oneself in the home? No    Relationships: Significant relationships include her mom, friends, therapist of several years. No romantic relationships. Says they're not particularly close with their brother.      Education: Aysha's highest level of education is current high school student. Says school is going well, is taking choir,  On track to graduate, is currently in their sophomore year at [employer]. Has 504 plan but no need for IEP or special ed services. Educational goals include maybe going to school to be a writer. Wants to be an author.  Not sure if they want to go to college.     Occupation: not currently working, unsure if they want to work while they're in school.     Finances: Aysha is financial supported by Family.     Spiritual considerations: Patient identifies with mallory community.  Raised [place] no spiritual beliefs .    Cultural influences: Aysha describes their race as white. Aysha's primary spoken language is English. Takes German in school. Aysha was not comfortable sharing their sexual orientation and are non-binary. Aysha prefers they/them pronouns. Cultural considerations to take into account when providing treatment include twin, writes fantasy fiction, .    Current Stressors: Reports they have certain triggers (drugs, their trauma history)    Strengths & Opportunities:  Hobbies and enjoyable activities include music, reading, writing, tv.  Self identified strengths are creative, thoughtful for friends and others, strong spirit, smart. Exercise and nutrition habits include none.  Coping mechanisms include Music and distraction, reading and writing can sometimes be helpful .     Legal Hx: No: Patient denies any legal " history     Trauma and/or Abuse Hx: There are indications or report of significant loss, trauma, abuse or neglect issues related to: are indications or report of significant loss, trauma, abuse or neglect issues related to. Issues of possible neglect are not present.      Hx: No       Developmental History:   Aysha was born with pregnancy or delivery complications.  Complications include preeclampsia -born premie at 36 weeks.   Aysha denies in utero substance exposure.   Aysha  did not meet developmental milestones on time.   Milestones not met include walking, talking, crawling, potty training.   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    Aysha did receive interventions for developmental delays.   Aysha  did require an IEP/504 Plan during school.   Interventions include 504 plan for headphones - noise cancelling overstimulated - wears most of the day.          Family History:   Family history of: mom has anxiety and depression.   denies history of completed suicides.    Past Psychiatric History   Past diagnoses: ADHD, OCD diagnosed with neuropsych testing 2 years ago. ASD diagnosed at age 2.  Past medication trials: lexapro 20mg, abilify started on 2mg, upped to 7mg 4 weeks ago (started a year ago). Hasn't worked much.  No other anti psychotic medications    Psychiatric Hospitalizations: 0  Commitment: No, Current Anguiano order: No  History of Electroconvulsive Therapy (ECT) or Transcranial Magnetic Stimulation (TMS): No    Self-Injurious Behavior: Hair Pulling and OCD behaviors that can cause injury/illness. Aysha declined to show me her hair/scalp today. Wearing a cap.  Suicidal Ideation Hx: Yes -   Suicide Attempt- #-:Yes - 3, most recent-Nov 22  Violence/Aggression Hx: No    Outpatient Programs & Services [Psychotherapy, DBT, Day Treatment, Eating Disorder Tx, etc]:   Current:  Psychiatric Nurse, Fatuma Mojica  [facility] - not helping, started 3 weeks ago. Not telling her how to help. Just focusing on depression and anxiety. They're not focusing on the voice.   Tuesday sees a counselor - [place] in [place] likes this therapist. Does DBT.     Past:  Started therapy a few years ago focusing on DBT         Past Medical History:    There is no problem list on file for this patient.    Primary Care Physician: No Ref-Primary, Physician  Last PCP Appointment Date: recently started treatment for hyperthyroidism, (mom says this is herditary and all females have it in their family). Started a new med one or two weeks ago   Medical problems: hyperthyroidism (recent), fainting  Surgical history: This patient has no significant past surgical history  History of seizures/head trauma/loss of consciousness? No. Fainted in 6th grade. Related to health class stuff - things that make them uncomfortable  - drugs.   Allergies: No Known Allergies      Substance Use History (review [facility]):   Caffeine: a soda here and there     Tobacco: none  ETOH: never    Cannabis: none          Othr Drugs: none  CD treatment hx: Aysha has not received chemical dependency treatment in the past.  Current sober supports include family and friends.  Based on the clinical interview, there are not indications of drug or alcohol abuse. Continue to monitor.   Discussed effect of substance use on overall health and how this may contribute to mental health symptoms. Aysha was adamant she does not want to do drugs and makes her feel anxious when discussing. (E.g. Needs to leave health class)     Psychiatric Review of Systems (Completed M.I.N.I. for Psychotic Disorders: No)     DEPRESSION  Past 2 Weeks:  none  Past Episode:  none  Notes: Aysha denied depressive symptoms. They endorsed sad, hopeless feelings that last up to an hour a day. Aysha was diagnosed with depression at age 14 and reports history of suicidal ideation and interrupted attempts.     SUICIDALITY: Current  "(within last month): Yes, risk Low  -reports 10% in response to \"How likely are to you to try to kill yourself within the next 3 months on a scale from 0-100%?\"  -reports current SI, denies intent and plan  -reports current SIB/Self Injurious Behavior  -denies current HI but reports they experience hearing a voice telling them to kill others. Typically in a non-specific way. Aysha has no intention of hurting anyone ever.   -denies past HI  -See Crisis and safety planning below for more information    SHELL/HYPOMANIA  Current Episode:  none  Past Episode:  none    PANIC:  none    AGORAPHOBIA:  none    SOCIAL ANXIETY:  none    OBSESSIVE-COMPULSIVE:  obsessions, compulsions, and examples included excessive handwashing - has greatly improved with CBT / exposure therapy (in [place] with a therapist named Anita at [facility]).   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 Aysha reports this has worsened and did not wish to show their scalp.    TRAUMA:   Aysha did not wish to discuss any details but says they do feel like they experienced a traumatic event in a therapist's office 2 years ago.    ALCOHOL & J. NON-ALCOHOL:  See below    PSYCHOSIS:   Present Symptoms:  auditory hallucinations  Past Symptoms:  auditory hallucinations, disorganized speech, disorganized behavior, and dissociative -like episods  Onset: 2023   Examples include:   -Paranoia/Suspiciousness: no  -Delusions Thoughts: no  -Thought Broadcasting: no  -Mind Reading: no  -Thought Insertion: no  -Delusions of Control: no  -Ideas of Reference: no   -AH: Yes  -VH: no  -Other Hallucinations: dissociative episodes: see ED note on 3/6/24  -Disorganized Speech: during episodes spoke in higher pitched, child-like voice  -Disorganized Behavior: yes during episode was unable to use a phone  -Cognitive Difficulties: yes, hx of ADHD  -Somatic Complaints: no    EATING DISORDER: none    GENERALIZED ANXIETY:  more specific anxiety when certain " "topics/triggers come up    RULE OUT MEDICAL, ORGANIC OR DRUG CAUSES FOR ALL DISORDERS  During any current disorder or past mood episode, patient reports:  A. Substance use or withdrawal: No  B. Medical illness: No    ANTISOCIAL PERSONALITY:  none   Other Cluster B Traits:  none discussed         Medications:   Per chart:  Current Outpatient Medications   Medication Sig Dispense Refill    FLUoxetine (PROZAC) 20 MG/5ML solution Take 40 mg by mouth daily      Melatonin 1 MG CHEW Take 2 mg by mouth      mvw complete formulation (CHEWABLES) tablet Take 1 tablet by mouth daily         Most Recent Labs & Vitals (per EPIC):   There were no vitals taken for this visit.    RECENT BRAIN IMAGING:  none    Additional Screening / Assessment Measures   PHQ9 was completed today, 3/11/25    Mental Status Exam   Alertness: alert  and oriented  Attention Span and Concentration:  Fair  Appearance: appeared stated age, casually dressed, and slightly unkempt  Behavior/Demeanor: cooperative, pleasant, and calm, with fair eye contact   Speech: regular rate and rhythm  Language: intact. Preferred language identified as English.  Psychomotor Behavior:  normal or unremarkable and would at times play on phone  but was listening  Mood: description consistent with euthymia \"I'm pretty good today\"  Affect: appropriate and in normal range; was congruent to mood; was congruent to content  Associations:  no loose associations  Thought Process:  unremarkable and responded \"I don't know\" to questions aimed at timeline  Thought Content:  auditory hallucinations present  Perception: auditory hallucinations  Insight: fair  Judgment: adequate for safety  Impulse Control:  intact  Cognition: does  appear grossly intact; formal cognitive testing was not done    Safety: There are notable risk factors for self-harm, including anxiety, psychosis, previous history of suicide attempts, and mood change. However, risk is mitigated by commitment to family, ability " to volunteer a safety plan, history of seeking help when needed, future oriented, no access to firearms or weapons, denies suicidal intent or plan, and denies homicidal ideation, intent, or plan. Therefore, based on all available evidence including the factors cited above, Aysha does not appear to be at imminent risk for self-harm, does not meet criteria for a 72-hr hold, and therefore remains appropriate for ongoing outpatient level of care.  Suicidality risk appeared Medium.  The patient convincingly denies suicidality on several occasions. There was no deceit detected, and the patient presented in a manner that was believable.    Safety plan was discussed and included review of crisis phone numbers. Recommended that patient call 911 or go to the local ED should there be a change in any of these risk factors.    CRISIS NUMBERS Emphasized:  [facility] [phone] (clinic)    [phone] (after hours), Calling [phone] to access [facility] crisis teams. Patient and/or family were agreeable to utilizing these resources and or call 911 or go to local ED in case of crisis. Provider discussed limits to crisis response as outpatient providers.    National Suicide Prevention Lifeline: 6-955-745-TALK (322-902-4601)  COPE 24/7 [place] Mobile Team -[phone] (adults)/ [phone] (child)  Poison Control Center - [phone]    OR  go to nearest ER  Crisis Text Line for any crisis 24/7 send this-   To: 018248   [facility]  [phone]  OTHER 988  Trans Lifeline a hotline for transgender people 1-445.113.3537  [employer] a hotline for LGBT youth 1-463.631.9074  Walk-In Counseling [facility]     [phone]      Provisional Psychiatric Diagnoses   Unspecified psychosis (auditory hallucinations), as well as unspecified dissociative disorder, as well as historical dx of OCD, ADHD, ASD, and trichotillomania       Assessment   Aysha is a 16 year old single White Not  or   "non binary individual with psychiatric history of ADHD, ASD and OCD who presented for an assessment of psychiatric symptoms by the First Episode of Psychosis program.  Aysha was referred by their mom's therapist and [facility].   She has a history of 0 psychiatric hospitalization(s).  Family history is significant for depression and anxiety.      Today, Aysha presents as a a good historian who varied in their ability to elaborate/answer questions. They often hesitated or looked down when asked about their triggers. Aysha has variable insight in their current circumstances and reports often losing time and is not sure what they did during those times.  Aysha reports first onset of psychiatric symptoms at age 14 (depression, OCD, ADHD), and psychotic symptoms at age 14, 24 months prior to today's assessment.  The above duration of untreated psychosis was approximately <1 year. Aysha started Abilify 12 months ago but reports it has \"done nothing\".  Based on today's assessment past symptoms of mental illness include OCD, trichotillomania, auditory hallucinations, dissociative periods. Current presenting symptoms appear to include auditory hallucination of their voice \"but a little deeper\". Aysha attributes symptoms to trauma.  Precipitating factors to aforementioned symptoms seem to be genetic loading, whereas perpetuating factors are comprised of Co-occurring ASD, ADHD, Dissociative episodes.  Substance use does not seem to be a present concern.     Aysha s reported symptoms of psychosis could be consistent with an episode of major depression with psychotic features, PTSD, dissociative disorder, schizoaffective disorder or a manifestation of positive/negative symptoms in a schizophrenia spectrum disorder. People with ASD may experience comorbid psychotic illnesses such as schizophrenia and bipolar disorder, and evidence is accumulating that individuals with ASD are at greater risk of developing psychotic illnesses than " those in the general population. Some overlapping ASD and psychotic symptoms include difficulties in reading others' intentions resembling paranoia, difficulties in expressive communication resembling thought disorder. Individuals with ASD, who may experience psychosis differently from the general population, do not appear to fit easily into existing diagnostic categories, causing difficulties with traditional differential diagnosis perspectives.     Diagnosis of unspecified psychosis seems supported by patient report, collateral records, and the MINI assessment tool.  Differential diagnosis of psychosis as secondary to ASD or Trauma.  Further diagnostic clarification is needed.  There are medical comorbidities which impact this treatment [ ASD, ADHD, Trauma history, suicide attempt, psychosis, and trauma hx] and should continue to be monitored.     Aysha has notable strengths, including capacity for insight, proven resilience through adversity, opportunities to live a meaningful life, and sense of belonging. Due to these strengths, I think Aysha has the potential to find mental well-being and if Aysha engages in their care and follows medical advice, Aysha has the potential to live a more comfortable life.  Psychosocial factors impacting treatment include mental health symptoms.  Aysha  has evidence of functional impairment including difficulty with social-strangers, social-friends, community activities, education, and self-care routines. More specifically, managing egodystonic command hallucinations to harm others, managing symptoms and hair pulling.  Goal is to increase their functioning detailed above and assist Aysha to make progress towards their goals.  Aysha identified the following factors that will help them succeed in recovery include , commitment to health and well being, exercise routine, friends / good social support, family support, intelligence, positive school connection, motivation, and sense  "of humor. Things that may interfere with their success include lacks coping skills and poor insight.     Aysha may meet criteria for the psychosis services offered through [facility]. This writer will provide verbal and/or written information about recommended services and programs in the context of treating psychosis during our feedback session.     Safety: Please refer to mental status exam for safety concerns and/or plan.    Aysha agrees to treatment with the capacity to do so. Agrees to call clinic for any problems. The patient understands to call 911 or come to the nearest ED if life threatening or urgent symptoms present. Please note, writer did not receive all pertinent medical records as of the time of this assessment. Aysha did not sign [facility]'s for additional records.    Billing for \"Interactive Complexity\"?    No    Plan   Next steps include intention of completing a continued multi-disciplinary assessment utilizing today's evaluation. The expertise of a PharmD, Psychologist, and Psychiatric consultation may be next steps. Informed patient that if deemed appropriate for the First Episode of Psychosis services, care will be provided with goal of reducing distressing symptoms and improving functional recovery.    Medication Management: Aysha is in need of Medication Management.  Medications will be addressed further during an MTM visit and new patient medication evaluation.  Continue to follow recommendations of current outpatient prescriber until recommendations are provided.     Therapy: Aysha was interested in meeting with a therapist. Aysha would benefit from therapy.   Aysha was and Family was interested in participating in the Family PsychoEducation Program.     Supported Employment & Education: Aysha is in need of employment and education support.     Case Management: Aysha is not followed by a .  Case Management is an identified need at this time. This writer will assist in short-term case " management support as needed until care is established with ongoing providers.     Other Psychosocial Supports: Aysha is not interested in the [facility].  Family would benefit from  Family Psychoeducation & Support Group.    Medical Referrals: Refer to primary if mom notices red, inflamed areas on scalp or if there is sign of infection     Referral information for the above mentioned supports will be discussed further at our upcoming feedback visit.   Without the recommended intervention, Aysha is likely to experience possible increase in psychotic symptoms requiring hospitalization.     TREATMENT RISK STATEMENT:  The risks, benefits, alternatives and potential adverse effects have been discussed and are understood by the pt. The pt understands the risks of using street drugs or alcohol. There are no medical contraindications, the pt agrees to treatment with the ability to do so. The pt knows to call the clinic for any problems or to access emergency care if needed.  Medical and substance use concerns are documented above.       PROVIDER: JONATHAN Rodríguez  SUPERVISOR: Clifton Jauregui, PhD    ambulatory